# Patient Record
Sex: MALE | Race: WHITE | NOT HISPANIC OR LATINO | Employment: FULL TIME | URBAN - METROPOLITAN AREA
[De-identification: names, ages, dates, MRNs, and addresses within clinical notes are randomized per-mention and may not be internally consistent; named-entity substitution may affect disease eponyms.]

---

## 2018-04-27 ENCOUNTER — OFFICE VISIT (OUTPATIENT)
Dept: NEUROLOGY | Facility: CLINIC | Age: 53
End: 2018-04-27

## 2018-04-27 VITALS
HEIGHT: 70 IN | DIASTOLIC BLOOD PRESSURE: 82 MMHG | WEIGHT: 252 LBS | SYSTOLIC BLOOD PRESSURE: 131 MMHG | BODY MASS INDEX: 36.08 KG/M2

## 2018-04-27 DIAGNOSIS — G44.209 TENSION HEADACHE: Primary | ICD-10-CM

## 2018-04-27 PROCEDURE — 99244 OFF/OP CNSLTJ NEW/EST MOD 40: CPT | Performed by: PSYCHIATRY & NEUROLOGY

## 2018-04-27 RX ORDER — ROSUVASTATIN CALCIUM 20 MG/1
20 TABLET, COATED ORAL
COMMUNITY

## 2018-04-27 RX ORDER — CLONAZEPAM 2 MG/1
2 TABLET ORAL 2 TIMES DAILY
Refills: 0 | COMMUNITY
Start: 2018-03-09

## 2018-04-27 RX ORDER — IRBESARTAN 150 MG/1
150 TABLET ORAL
COMMUNITY

## 2018-04-27 RX ORDER — TAMSULOSIN HYDROCHLORIDE 0.4 MG/1
1 CAPSULE ORAL DAILY
Refills: 0 | COMMUNITY
Start: 2018-03-23

## 2018-04-27 RX ORDER — TOPIRAMATE 50 MG/1
50 TABLET, FILM COATED ORAL 2 TIMES DAILY
Qty: 60 TABLET | Refills: 1 | Status: SHIPPED | OUTPATIENT
Start: 2018-04-27 | End: 2018-06-27 | Stop reason: SDUPTHER

## 2018-04-27 RX ORDER — BUSPIRONE HYDROCHLORIDE 15 MG/1
TABLET ORAL
Refills: 0 | COMMUNITY
Start: 2018-04-07

## 2018-04-27 RX ORDER — ASPIRIN 81 MG/1
81 TABLET, CHEWABLE ORAL
COMMUNITY

## 2018-04-27 RX ORDER — CHLORAL HYDRATE 500 MG
1 CAPSULE ORAL
COMMUNITY

## 2018-04-27 RX ORDER — ESCITALOPRAM OXALATE 20 MG/1
20 TABLET ORAL
COMMUNITY

## 2018-04-27 RX ORDER — METFORMIN HYDROCHLORIDE 500 MG/1
TABLET, EXTENDED RELEASE ORAL
Refills: 0 | COMMUNITY
Start: 2018-04-17 | End: 2018-04-27 | Stop reason: SDUPTHER

## 2018-04-27 RX ORDER — ZOLPIDEM TARTRATE 10 MG/1
10 TABLET ORAL
COMMUNITY

## 2018-04-27 RX ORDER — LEVOTHYROXINE SODIUM 88 UG/1
TABLET ORAL
Refills: 0 | COMMUNITY
Start: 2018-04-17 | End: 2018-04-27 | Stop reason: SDUPTHER

## 2018-04-27 RX ORDER — LEVOTHYROXINE SODIUM 88 UG/1
88 TABLET ORAL
COMMUNITY
Start: 2016-02-25

## 2018-04-27 RX ORDER — OXYBUTYNIN CHLORIDE 10 MG/1
TABLET, EXTENDED RELEASE ORAL
Refills: 1 | COMMUNITY
Start: 2018-03-23

## 2018-04-27 RX ORDER — TOPIRAMATE 25 MG/1
25 TABLET ORAL 2 TIMES DAILY
Qty: 28 TABLET | Refills: 0 | Status: SHIPPED | OUTPATIENT
Start: 2018-04-27 | End: 2018-05-11

## 2018-04-27 NOTE — PROGRESS NOTES
Subjective:    CC: Darwin Marks is seen today in consultation at the request of Rupesh Sanchez, * for Arnold Chiari Malformation       HPI:  Patient is a 53-year-old male with past medical history of anxiety and depression, diabetes, hypothyroidism, hypercholesterolemia, hypertension and insomnia referred to the clinic the for headaches and abnormal MRI brain finding.  He reports that he started having headaches a few months ago which he attributed to lot of stress related to work.  Headache  lasted for approximately 5 weeks continuously.  He reports that the headache starts at the back of the neck and then it would radiate to involve the head in a bandlike fashion.  He denies any associated to photosensitivity or sound sensitivity and denies any nausea or vomiting.  He describes the pain as a dull aching type of pain or sometimes squeezing type of pain with maximum intensity being 6-7 out of 10.  Following this, he decided to take the a few weeks off of work and that helped reduce the intensity and frequency of headaches.  He underwent MRI brain without contrast for further evaluation which I reviewed personally which revealed a cerebellar tonsillar ectopia 4 mm below the foramen magnum.  Currently he reports that the headaches are much better when it started but he still has approximately 2 headaches in a week.  He does have obstructive sleep apnea and is on CPAP machine but he stopped using it  few weeks ago because he was not able to tolerate it.      The following portions of the patient's history were reviewed today and updated as appropriate: allergies, current medications, past family history, past medical history, past social history, past surgical history and problem list. This document will be scanned to patient's chart.    Review of Systems   Constitutional: Positive for fatigue.   Eyes: Negative.    Respiratory: Negative.    Cardiovascular: Negative.    Endocrine: Negative.    Musculoskeletal:  "Negative.    Skin: Negative.    Allergic/Immunologic: Negative.    Neurological: Positive for numbness and headaches.   Psychiatric/Behavioral: Positive for decreased concentration, sleep disturbance and depressed mood. The patient is nervous/anxious.      Objective:    /82   Ht 177.8 cm (70\")   Wt 114 kg (252 lb)   BMI 36.16 kg/m²     Neurology Exam:  General apperance: NAD.     Mental status: Alert, awake and oriented to time place and person.    Recent and Remote memory: Can recall 3/3 objects at 5 minutes. Can recall historical events.     Attention span and Concentration: Serial 7s: Normal.     Fund of knowledge:  Normal.     Language and Speech: No aphasia or dysarthria.    Naming , Repitition and Comprehension:  Can name objects, repeat a sentence and follow commands. Speech is clear and fluent with good repetition, comprehension, and naming.    CN II to XII: Intact.    Opthalmoscopic Exam: No papilledema.    Motor:  Right UE muscle strength 5/5. Normal tone.     Left UE muscle strength 5/5. Normal tone.      Right LE muscle strength5/5. Normal tone.     Left LE muscle strength 5/5. Normal tone.      Sensory: Normal light touch, vibration and pinprick sensation bilaterally.    DTRs: 2+ bilaterally.    Babinski: Negative bilaterally.    Co-ordination: Normal finger-to-nose, heel to shin B/L.    Rhomberg: Negative.    Gait: Normal.    Cardiovascular: Regular rate and rhythm without murmur, gallop or rub.    Assessment and Plan:  1. Tension headache  -Tension type of headaches with current frequency being approximately 8-10 headaches in a month.  I reviewed MRI with patient in detail and showed him the images.  MRI shows cerebellar tonsillar ectopia of  4 mm below the foramen magnum.  This is a normal variant and does not qualify the criteria for Arnold-Chiari malformation.  I have reassured the patient. Because of frequent headaches, I'll start him on Topamax starting at 25 mg at bedtime slowly " increasing to 50 mg twice a day dose in 4 weeks.  I'll see him in the clinic in 4 weeks for reassessment.  I have advised him to start using CPAP for sleep apnea as it certainly can make headaches worse.        Return in about 4 weeks (around 5/25/2018).

## 2018-06-27 ENCOUNTER — OFFICE VISIT (OUTPATIENT)
Dept: NEUROLOGY | Facility: CLINIC | Age: 53
End: 2018-06-27

## 2018-06-27 VITALS
HEIGHT: 70 IN | BODY MASS INDEX: 36.08 KG/M2 | SYSTOLIC BLOOD PRESSURE: 115 MMHG | WEIGHT: 252 LBS | DIASTOLIC BLOOD PRESSURE: 77 MMHG

## 2018-06-27 DIAGNOSIS — G44.209 TENSION HEADACHE: Primary | ICD-10-CM

## 2018-06-27 PROCEDURE — 99213 OFFICE O/P EST LOW 20 MIN: CPT | Performed by: PSYCHIATRY & NEUROLOGY

## 2018-06-27 RX ORDER — TOPIRAMATE 50 MG/1
50 TABLET, FILM COATED ORAL 2 TIMES DAILY
Qty: 60 TABLET | Refills: 3 | Status: SHIPPED | OUTPATIENT
Start: 2018-06-27

## 2018-06-27 NOTE — PROGRESS NOTES
Subjective:    CC: Darwin Marks is in clinic today for follow up for  headaches.    HPI:  He is in the clinic for regular follow-up.  Since the last visit, he reports that the he has been taking Topamax 50 mg twice a day and it has helped significantly reduce intensity and frequency of headaches.  He is reporting approximately 6-7 headaches in last 4 weeks but they're not as intense.  This is reduced from approximately 10-15 headache days in a month.  He denies any side effects with Topamax use.    The following portions of the patient's history were reviewed and updated as of 06/27/2018: allergies, social history and problem list.       Current Outpatient Prescriptions:   •  aspirin 81 MG chewable tablet, Chew 81 mg., Disp: , Rfl:   •  busPIRone (BUSPAR) 15 MG tablet, , Disp: , Rfl: 0  •  Canagliflozin (INVOKANA) 300 MG tablet, Take 300 mg by mouth., Disp: , Rfl:   •  clonazePAM (KlonoPIN) 2 MG tablet, Take 2 mg by mouth 2 (Two) Times a Day., Disp: , Rfl: 0  •  escitalopram (LEXAPRO) 20 MG tablet, Take 20 mg by mouth., Disp: , Rfl:   •  irbesartan (AVAPRO) 150 MG tablet, Take 150 mg by mouth., Disp: , Rfl:   •  IRON PO, Take 15 mL by mouth., Disp: , Rfl:   •  levothyroxine (SYNTHROID, LEVOTHROID) 88 MCG tablet, Take 88 mcg by mouth., Disp: , Rfl:   •  metFORMIN (GLUCOPHAGE) 1000 MG tablet, Take 1,000 mg by mouth., Disp: , Rfl:   •  Omega-3 1000 MG capsule, Take 1 capsule by mouth., Disp: , Rfl:   •  oxybutynin XL (DITROPAN-XL) 10 MG 24 hr tablet, , Disp: , Rfl: 1  •  rosuvastatin (CRESTOR) 20 MG tablet, Take 20 mg by mouth., Disp: , Rfl:   •  tamsulosin (FLOMAX) 0.4 MG capsule 24 hr capsule, Take 1 capsule by mouth Daily., Disp: , Rfl: 0  •  topiramate (TOPAMAX) 50 MG tablet, Take 1 tablet by mouth 2 (Two) Times a Day., Disp: 60 tablet, Rfl: 3  •  zolpidem (AMBIEN) 10 MG tablet, Take 10 mg by mouth., Disp: , Rfl:    Past Medical History:   Diagnosis Date   • Anxiety and depression    • Diabetes mellitus       No  "past surgical history on file.   Family History   Problem Relation Age of Onset   • Dementia Mother    • Diabetes Father    • Diabetes Sister    • Diabetes Brother    • Diabetes Paternal Aunt         Review of Systems   Constitutional: Positive for fatigue.   Respiratory: Negative.    Cardiovascular: Negative.    Neurological: Positive for light-headedness.   Psychiatric/Behavioral: Positive for depressed mood. The patient is nervous/anxious.      Objective:    /77   Ht 177.8 cm (70\")   Wt 114 kg (252 lb)   BMI 36.16 kg/m²     Neurology Exam:  General apperance: NAD.     Mental status: Alert, awake and oriented to time place and person.    Recent and Remote memory: Can recall 3/3 objects at 5 minutes. Can recall historical events.     Attention span and Concentration: Serial 7s: Normal.     Fund of knowledge:  Normal.     Language and Speech: No aphasia or dysarthria.    Naming , Repitition and Comprehension:  Can name objects, repeat a sentence and follow commands. Speech is clear and fluent with good repetition, comprehension, and naming.    CN II to XII: Intact.    Opthalmoscopic Exam: No papilledema.    Motor:  Right UE muscle strength 5/5. Normal tone.     Left UE muscle strength 5/5. Normal tone.      Right LE muscle strength5/5. Normal tone.     Left LE muscle strength 5/5. Normal tone.      Sensory: Normal light touch, vibration and pinprick sensation bilaterally.    DTRs: 2+ bilaterally.    Babinski: Negative bilaterally.    Co-ordination: Normal finger-to-nose, heel to shin B/L.    Rhomberg: Negative.    Gait: Normal.    Cardiovascular: Regular rate and rhythm without murmur, gallop or rub.    Assessment and Plan:  1. Tension headache  Responded very well to Topamax 50 mg twice a day dose.  Both headache intensity and frequency is reduced.  He is tolerating the current dose well without any side effects.  I will see him back in 2 months for follow-up.     I spent 15 minutes face to face with the " patient and spent 10 minutes of this time counseling and discussing about taking medication regularly, making sure to drink plenty of water while on Topamax to avoid kidney stone, importance of good sleep hygiene and regular exercise.    Return in about 2 months (around 8/27/2018).

## 2018-08-20 ENCOUNTER — TELEPHONE (OUTPATIENT)
Dept: NEUROLOGY | Facility: CLINIC | Age: 53
End: 2018-08-20

## 2018-08-20 NOTE — TELEPHONE ENCOUNTER
----- Message from Norman Kay MD sent at 8/18/2018  3:27 PM EDT -----  Contact: TIMO PINTO (PT)  Tell him not to stop Topamax until he sees me if he doesn't have any side effects.  Thanks.  ----- Message -----  From: Snellen, Patricia Danielle, CMA  Sent: 8/17/2018   1:20 PM  To: Norman Kay MD        ----- Message -----  From: Kaya Sprague RegSched Rep  Sent: 8/17/2018  11:59 AM  To: [facility] Clinical Pool    Rahul    PT called to reschedule his upcoming follow up for a different date. He's also wondering if he can begin weaning off of Topiramate because he feels that his headaches are now gone? PT says that he can wait until after his follow up, but he'd like to start sooner.     Please call Timo back: [phone]

## 2018-09-07 ENCOUNTER — OFFICE VISIT (OUTPATIENT)
Dept: NEUROLOGY | Facility: CLINIC | Age: 53
End: 2018-09-07

## 2018-09-07 VITALS
HEIGHT: 70 IN | SYSTOLIC BLOOD PRESSURE: 112 MMHG | BODY MASS INDEX: 36.08 KG/M2 | WEIGHT: 252 LBS | DIASTOLIC BLOOD PRESSURE: 81 MMHG

## 2018-09-07 DIAGNOSIS — G44.209 TENSION HEADACHE: Primary | ICD-10-CM

## 2018-09-07 PROCEDURE — 99213 OFFICE O/P EST LOW 20 MIN: CPT | Performed by: PSYCHIATRY & NEUROLOGY

## 2018-09-07 NOTE — PROGRESS NOTES
Subjective:    CC: Darwin Marks is in clinic today for follow up for  tension-type of headaches.        HPI:  He is in clinic for regular follow-up.  Since her last visit, he reports that headaches have completely resolved.  He is retired now and also changed his job which has reduce the stress level significantly.  He is requesting to come off of Topamax.    The following portions of the patient's history were reviewed and updated as of 09/07/2018: allergies, social history and problem list.       Current Outpatient Prescriptions:   •  aspirin 81 MG chewable tablet, Chew 81 mg., Disp: , Rfl:   •  busPIRone (BUSPAR) 15 MG tablet, , Disp: , Rfl: 0  •  Canagliflozin (INVOKANA) 300 MG tablet, Take 300 mg by mouth., Disp: , Rfl:   •  clonazePAM (KlonoPIN) 2 MG tablet, Take 2 mg by mouth 2 (Two) Times a Day., Disp: , Rfl: 0  •  escitalopram (LEXAPRO) 20 MG tablet, Take 20 mg by mouth., Disp: , Rfl:   •  irbesartan (AVAPRO) 150 MG tablet, Take 150 mg by mouth., Disp: , Rfl:   •  IRON PO, Take 15 mL by mouth., Disp: , Rfl:   •  levothyroxine (SYNTHROID, LEVOTHROID) 88 MCG tablet, Take 88 mcg by mouth., Disp: , Rfl:   •  metFORMIN (GLUCOPHAGE) 1000 MG tablet, Take 1,000 mg by mouth., Disp: , Rfl:   •  Omega-3 1000 MG capsule, Take 1 capsule by mouth., Disp: , Rfl:   •  oxybutynin XL (DITROPAN-XL) 10 MG 24 hr tablet, , Disp: , Rfl: 1  •  rosuvastatin (CRESTOR) 20 MG tablet, Take 20 mg by mouth., Disp: , Rfl:   •  tamsulosin (FLOMAX) 0.4 MG capsule 24 hr capsule, Take 1 capsule by mouth Daily., Disp: , Rfl: 0  •  topiramate (TOPAMAX) 50 MG tablet, Take 1 tablet by mouth 2 (Two) Times a Day., Disp: 60 tablet, Rfl: 3  •  zolpidem (AMBIEN) 10 MG tablet, Take 10 mg by mouth., Disp: , Rfl:    Past Medical History:   Diagnosis Date   • Anxiety and depression    • Diabetes mellitus (CMS/HCC)       No past surgical history on file.   Family History   Problem Relation Age of Onset   • Dementia Mother    • Diabetes Father    • Diabetes  "Sister    • Diabetes Brother    • Diabetes Paternal Aunt         Review of Systems   Constitutional: Weight loss: decreased 7lbs    Respiratory: Negative.    Cardiovascular: Negative.    Neurological: Negative.    Psychiatric/Behavioral: Positive for depressed mood. The patient is nervous/anxious.      Objective:    /81   Ht 177.8 cm (70\")   Wt 114 kg (252 lb)   BMI 36.16 kg/m²     Neurology Exam:  General apperance: NAD.     Mental status: Alert, awake and oriented to time place and person.    Recent and Remote memory: Can recall 3/3 objects at 5 minutes. Can recall historical events.     Attention span and Concentration: Serial 7s: Normal.     Fund of knowledge:  Normal.     Language and Speech: No aphasia or dysarthria.    Naming , Repitition and Comprehension:  Can name objects, repeat a sentence and follow commands. Speech is clear and fluent with good repetition, comprehension, and naming.    CN II to XII: Intact.    Opthalmoscopic Exam: No papilledema.    Motor:  Right UE muscle strength 5/5. Normal tone.     Left UE muscle strength 5/5. Normal tone.      Right LE muscle strength5/5. Normal tone.     Left LE muscle strength 5/5. Normal tone.      Sensory: Normal light touch, vibration and pinprick sensation bilaterally.    DTRs: 2+ bilaterally.    Babinski: Negative bilaterally.    Co-ordination: Normal finger-to-nose, heel to shin B/L.    Rhomberg: Negative.    Gait: Normal.    Cardiovascular: Regular rate and rhythm without murmur, gallop or rub.    Assessment and Plan:  1. Tension headache  Headaches have completely resolved and he hasn't had any headaches since July 2018.  Her advised him to reduce Topamax to 25 mg BID dose for 7 days then 25 mg daily for 3 days and then stop.  I have advised that in case if the headaches return then call my office and schedule appointment otherwise, I will see him as needed.       I spent 15 minutes face to face with the patient and spent 10 minutes of this time " counseling and discussing about taking medication regularly, possible side effects with medication use, importance of good sleep hygiene, good hydration and regular exercise.    Return if symptoms worsen or fail to improve.

## 2020-10-06 ENCOUNTER — TELEPHONE (OUTPATIENT)
Dept: ENDOCRINOLOGY | Facility: CLINIC | Age: 55
End: 2020-10-06

## 2020-10-06 NOTE — TELEPHONE ENCOUNTER
Patient states he spoke with Miya last week and was waiting for a return call about a dosage change for Ozempic. He said he was supposed to increase his dose this week and was calling to see if he still needed to do so. He is having a lot of nausea taking this medication. [phone]

## 2020-10-07 ENCOUNTER — TELEPHONE (OUTPATIENT)
Dept: ENDOCRINOLOGY | Facility: CLINIC | Age: 55
End: 2020-10-07

## 2020-10-09 NOTE — TELEPHONE ENCOUNTER
Looks like we started him a month ago at 0.25mg once a week.  He should have just titrated up to 0.5mg after 4 doses of the 0.25mg dose.  Some people have nausea with the dose increase but it usually gets better after another month or so.  If he can tolerate this, I would like to keep him on the 0.5mg dose for now.

## 2021-03-26 ENCOUNTER — TELEPHONE (OUTPATIENT)
Dept: ENDOCRINOLOGY | Facility: CLINIC | Age: 56
End: 2021-03-26

## 2023-11-09 ENCOUNTER — OFFICE VISIT (OUTPATIENT)
Dept: SLEEP MEDICINE | Facility: HOSPITAL | Age: 58
End: 2023-11-09
Payer: COMMERCIAL

## 2023-11-09 VITALS
OXYGEN SATURATION: 97 % | HEIGHT: 71 IN | HEART RATE: 72 BPM | BODY MASS INDEX: 29.79 KG/M2 | SYSTOLIC BLOOD PRESSURE: 130 MMHG | WEIGHT: 212.8 LBS | DIASTOLIC BLOOD PRESSURE: 75 MMHG

## 2023-11-09 DIAGNOSIS — G47.19 EXCESSIVE DAYTIME SLEEPINESS: ICD-10-CM

## 2023-11-09 DIAGNOSIS — G47.33 MODERATE OBSTRUCTIVE SLEEP APNEA: Primary | ICD-10-CM

## 2023-11-09 DIAGNOSIS — R06.83 SNORING: ICD-10-CM

## 2023-11-09 RX ORDER — FAMOTIDINE 40 MG/1
TABLET, FILM COATED ORAL
COMMUNITY
Start: 2023-11-06

## 2023-11-09 RX ORDER — CHOLECALCIFEROL (VITAMIN D3) 125 MCG
10 CAPSULE ORAL NIGHTLY
COMMUNITY

## 2023-11-09 RX ORDER — PROCHLORPERAZINE 25 MG/1
SUPPOSITORY RECTAL
COMMUNITY
Start: 2023-10-13

## 2023-11-09 RX ORDER — TIRZEPATIDE 12.5 MG/.5ML
INJECTION, SOLUTION SUBCUTANEOUS
COMMUNITY
Start: 2023-11-03

## 2023-11-09 RX ORDER — PROCHLORPERAZINE 25 MG/1
SUPPOSITORY RECTAL
COMMUNITY
Start: 2023-10-16

## 2023-11-09 RX ORDER — TRAZODONE HYDROCHLORIDE 50 MG/1
50 TABLET ORAL
COMMUNITY

## 2023-11-09 RX ORDER — LEVOTHYROXINE SODIUM 75 UG/1
75 CAPSULE ORAL
COMMUNITY

## 2023-11-09 RX ORDER — FINERENONE 10 MG/1
TABLET, FILM COATED ORAL
COMMUNITY
Start: 2023-07-15

## 2023-11-09 RX ORDER — METOPROLOL TARTRATE 50 MG/1
50 TABLET, FILM COATED ORAL 2 TIMES DAILY
COMMUNITY

## 2023-11-09 NOTE — PROGRESS NOTES
Darwin Marks is a 58 y.o. male.   Chief Complaint   Patient presents with    Sleeping Problem       HPI     58 y.o. male seen in consultation at the request of BILLY Ruggiero for evaluation of the above.     He has longstanding history of obstructive sleep apnea.  He was first diagnosed 8 or 10 years ago.  He basically has been on CPAP therapy off and on but has not used it recently.  He has poor tolerance of therapy.  He has been tried on multiple masks.    He has a remote history of a uvulopalatoplasty and tonsillectomy in June 1997 at .  This was well before his diagnosis of BRICE but was reportedly performed for large tonsils and issues with pharyngitis.  He also had a deviated septum repair at the time.    He subsequently lost significant amounts of weight.  At his peak he was 325 pounds and has lost down to 211 with a goal of 190.      He underwent a follow-up sleep study at [facility] on 6/26/2023.  This revealed a moderate degree of obstructive sleep apnea with an AHI of 28.6.  All of the events were obstructive in nature.    He has ongoing problems with frequent nocturnal awakenings and daytime somnolence.  He says he never feels completely rested.  He will typically awaken 2 or 3 times at night and averages up to 6 hours of sleep at a nightly basis.  He typically does not nap during the day.    Pyote Scale is: 11/24    The patient's relevant past medical, surgical, family, and social history reviewed and updated in Epic as appropriate.    Current medications are:   Current Outpatient Medications:     aspirin 81 MG chewable tablet, Chew 1 tablet., Disp: , Rfl:     Cholecalciferol (Vitamin D3) 1.25 MG (72053 UT) tablet, Take  by mouth., Disp: , Rfl:     Continuous Blood Gluc Sensor (Dexcom G6 Sensor), , Disp: , Rfl:     Continuous Blood Gluc Transmit (Dexcom G6 Transmitter) misc, , Disp: , Rfl:     empagliflozin (JARDIANCE) 25 MG tablet tablet, Take 1 tablet by mouth., Disp: , Rfl:     escitalopram  "(LEXAPRO) 20 MG tablet, Take 1 tablet by mouth., Disp: , Rfl:     famotidine (PEPCID) 40 MG tablet, , Disp: , Rfl:     Kerendia 10 MG tablet, , Disp: , Rfl:     levothyroxine sodium (TIROSINT) 75 MCG capsule, Take 1 capsule by mouth., Disp: , Rfl:     melatonin 5 MG tablet tablet, Take 2 tablets by mouth Every Night., Disp: , Rfl:     metoprolol tartrate (LOPRESSOR) 50 MG tablet, Take 1 tablet by mouth 2 (Two) Times a Day., Disp: , Rfl:     Mounjaro 12.5 MG/0.5ML solution pen-injector, , Disp: , Rfl:     rosuvastatin (CRESTOR) 20 MG tablet, Take 1 tablet by mouth., Disp: , Rfl:     tamsulosin (FLOMAX) 0.4 MG capsule 24 hr capsule, Take 1 capsule by mouth Daily., Disp: , Rfl: 0    traZODone (DESYREL) 50 MG tablet, Take 1 tablet by mouth., Disp: , Rfl: .    Review of Systems    Review of Systems  ROS documented in patient questionnaire ×14 systems.  Reviewed with patient.  Otherwise negative except as noted in HPI.    Physical Exam    Blood pressure 130/75, pulse 72, height 179.1 cm (70.5\"), weight 96.5 kg (212 lb 12.8 oz), SpO2 97%. Body mass index is 30.1 kg/m².    Physical Exam  Vitals and nursing note reviewed.   Constitutional:       Appearance: Normal appearance. He is well-developed.   HENT:      Head: Normocephalic and atraumatic.      Nose: Nose normal.      Mouth/Throat:      Mouth: Mucous membranes are moist.      Pharynx: Oropharynx is clear. No oropharyngeal exudate.      Comments: Prior UPPP  Eyes:      General: No scleral icterus.     Conjunctiva/sclera: Conjunctivae normal.   Neck:      Thyroid: No thyromegaly.      Trachea: No tracheal deviation.   Cardiovascular:      Rate and Rhythm: Normal rate and regular rhythm.      Heart sounds: No murmur heard.     No friction rub. No gallop.   Pulmonary:      Effort: Pulmonary effort is normal. No respiratory distress.      Breath sounds: No wheezing or rales.   Musculoskeletal:         General: No deformity. Normal range of motion.   Skin:     General: Skin " is warm and dry.      Findings: No rash.   Neurological:      Mental Status: He is alert and oriented to person, place, and time.   Psychiatric:         Behavior: Behavior normal.         Thought Content: Thought content normal.         DATA:    Reviewed 6/26/2023 PSG report revealing AHI of 28.6    Reviewed bed partner questionnaire and interviewed his wife via telephone    ASSESSMENT:    Problem List Items Addressed This Visit          Pulmonary Problems    Moderate obstructive sleep apnea - Primary    Relevant Orders    Ambulatory Referral to ENT (Otolaryngology) (Completed)     Other Visit Diagnoses       Snoring        Excessive daytime sleepiness                58-year-old male with moderate obstructive sleep apnea which was recently confirmed by a PSG on 6/26.  These events were all obstructive in nature.  He has been historically intolerant of CPAP therapy over many years.  He has continued symptomatic sleep apnea with daytime somnolence and disturbed sleep despite over 100 pounds of weight loss and a uvulopalatoplasty in the remote past.  I think he would be a good candidate for consideration of a hypoglossal nerve stimulator.    PLAN:    I discussed alternatives to PAP therapy with the patient in detail.  He is very interested in pursuing a HGNS  I gave him literature on the inspire device  He seems to meet criteria for the device and I will refer him to Dr. Nunez for further work-up including an exam under anesthesia  Will follow him up here after implantation or if he is not a candidate we can see him for any further recommendations.    I have reviewed the results of my evaluation and impression and discussed my recommendations in detail with the patient.    Signed by  Enrique Winter MD    November 9, 2023      CC: Rupesh Sanchez MD          No ref. provider found

## 2023-11-09 NOTE — LETTER
November 9, 2023       No Recipients    Patient: Darwin Marks   YOB: 1965   Date of Visit: 11/9/2023     Dear BILLY Watkins:       Thank you for referring Darwin Marks to me for evaluation. Below are the relevant portions of my assessment and plan of care.    If you have questions, please do not hesitate to call me. I look forward to following Darwin along with you.         Sincerely,        Enrique Winter MD        CC:   No Recipients    Enrique Winter MD  11/09/23 1518  Sign when Signing Visit    Darwin Marks is a 58 y.o. male.   Chief Complaint   Patient presents with   • Sleeping Problem       HPI     58 y.o. male seen in consultation at the request of BILLY Ruggiero for evaluation of the above.     He has longstanding history of obstructive sleep apnea.  He was first diagnosed 8 or 10 years ago.  He basically has been on CPAP therapy off and on but has not used it recently.  He has poor tolerance of therapy.  He has been tried on multiple masks.    He has a remote history of a uvulopalatoplasty and tonsillectomy in June 1997 at .  This was well before his diagnosis of BRICE but was reportedly performed for large tonsils and issues with pharyngitis.  He also had a deviated septum repair at the time.    He subsequently lost significant amounts of weight.  At his peak he was 325 pounds and has lost down to 211 with a goal of 190.      He underwent a follow-up sleep study at [facility] on 6/26/2023.  This revealed a moderate degree of obstructive sleep apnea with an AHI of 28.6.  All of the events were obstructive in nature.    He has ongoing problems with frequent nocturnal awakenings and daytime somnolence.  He says he never feels completely rested.  He will typically awaken 2 or 3 times at night and averages up to 6 hours of sleep at a nightly basis.  He typically does not nap during the day.    [place] Scale is: 11/24    The patient's relevant past medical, surgical,  "family, and social history reviewed and updated in Epic as appropriate.    Current medications are:   Current Outpatient Medications:   •  aspirin 81 MG chewable tablet, Chew 1 tablet., Disp: , Rfl:   •  Cholecalciferol (Vitamin D3) 1.25 MG (43051 UT) tablet, Take  by mouth., Disp: , Rfl:   •  Continuous Blood Gluc Sensor (Dexcom G6 Sensor), , Disp: , Rfl:   •  Continuous Blood Gluc Transmit (Dexcom G6 Transmitter) misc, , Disp: , Rfl:   •  empagliflozin (JARDIANCE) 25 MG tablet tablet, Take 1 tablet by mouth., Disp: , Rfl:   •  escitalopram (LEXAPRO) 20 MG tablet, Take 1 tablet by mouth., Disp: , Rfl:   •  famotidine (PEPCID) 40 MG tablet, , Disp: , Rfl:   •  Kerendia 10 MG tablet, , Disp: , Rfl:   •  levothyroxine sodium (TIROSINT) 75 MCG capsule, Take 1 capsule by mouth., Disp: , Rfl:   •  melatonin 5 MG tablet tablet, Take 2 tablets by mouth Every Night., Disp: , Rfl:   •  metoprolol tartrate (LOPRESSOR) 50 MG tablet, Take 1 tablet by mouth 2 (Two) Times a Day., Disp: , Rfl:   •  Mounjaro 12.5 MG/0.5ML solution pen-injector, , Disp: , Rfl:   •  rosuvastatin (CRESTOR) 20 MG tablet, Take 1 tablet by mouth., Disp: , Rfl:   •  tamsulosin (FLOMAX) 0.4 MG capsule 24 hr capsule, Take 1 capsule by mouth Daily., Disp: , Rfl: 0  •  traZODone (DESYREL) 50 MG tablet, Take 1 tablet by mouth., Disp: , Rfl: .    Review of Systems    Review of Systems  ROS documented in patient questionnaire ×14 systems.  Reviewed with patient.  Otherwise negative except as noted in HPI.    Physical Exam    Blood pressure 130/75, pulse 72, height 179.1 cm (70.5\"), weight 96.5 kg (212 lb 12.8 oz), SpO2 97%. Body mass index is 30.1 kg/m².    Physical Exam  Vitals and nursing note reviewed.   Constitutional:       Appearance: Normal appearance. He is well-developed.   HENT:      Head: Normocephalic and atraumatic.      Nose: Nose normal.      Mouth/Throat:      Mouth: Mucous membranes are moist.      Pharynx: Oropharynx is clear. No oropharyngeal " exudate.      Comments: Prior UPPP  Eyes:      General: No scleral icterus.     Conjunctiva/sclera: Conjunctivae normal.   Neck:      Thyroid: No thyromegaly.      Trachea: No tracheal deviation.   Cardiovascular:      Rate and Rhythm: Normal rate and regular rhythm.      Heart sounds: No murmur heard.     No friction rub. No gallop.   Pulmonary:      Effort: Pulmonary effort is normal. No respiratory distress.      Breath sounds: No wheezing or rales.   Musculoskeletal:         General: No deformity. Normal range of motion.   Skin:     General: Skin is warm and dry.      Findings: No rash.   Neurological:      Mental Status: He is alert and oriented to person, place, and time.   Psychiatric:         Behavior: Behavior normal.         Thought Content: Thought content normal.         DATA:    Reviewed 6/26/2023 PSG report revealing AHI of 28.6    Reviewed bed partner questionnaire and interviewed his wife via telephone    ASSESSMENT:    Problem List Items Addressed This Visit          Pulmonary Problems    Moderate obstructive sleep apnea - Primary    Relevant Orders    Ambulatory Referral to ENT (Otolaryngology) (Completed)     Other Visit Diagnoses       Snoring        Excessive daytime sleepiness                58-year-old male with moderate obstructive sleep apnea which was recently confirmed by a PSG on 6/26.  These events were all obstructive in nature.  He has been historically intolerant of CPAP therapy over many years.  He has continued symptomatic sleep apnea with daytime somnolence and disturbed sleep despite over 100 pounds of weight loss and a uvulopalatoplasty in the remote past.  I think he would be a good candidate for consideration of a hypoglossal nerve stimulator.    PLAN:    I discussed alternatives to PAP therapy with the patient in detail.  He is very interested in pursuing a HGNS  I gave him literature on the inspire device  He seems to meet criteria for the device and I will refer him to   Enrique for further work-up including an exam under anesthesia  Will follow him up here after implantation or if he is not a candidate we can see him for any further recommendations.    I have reviewed the results of my evaluation and impression and discussed my recommendations in detail with the patient.    Signed by  Enrique Winter MD    November 9, 2023      CC: Rupesh Sanchez MD          No ref. provider found